# Patient Record
Sex: MALE | Employment: UNEMPLOYED | URBAN - METROPOLITAN AREA
[De-identification: names, ages, dates, MRNs, and addresses within clinical notes are randomized per-mention and may not be internally consistent; named-entity substitution may affect disease eponyms.]

---

## 2020-10-22 LAB
A/G RATIO: NORMAL
ALBUMIN SERPL-MCNC: NORMAL G/DL
ALP BLD-CCNC: NORMAL U/L
ALT SERPL-CCNC: NORMAL U/L
AST SERPL-CCNC: NORMAL U/L
BASOPHILS ABSOLUTE: NORMAL
BASOPHILS RELATIVE PERCENT: NORMAL
BILIRUB SERPL-MCNC: NORMAL MG/DL
BILIRUBIN DIRECT: NORMAL
BILIRUBIN, INDIRECT: NORMAL
EOSINOPHILS ABSOLUTE: NORMAL
EOSINOPHILS RELATIVE PERCENT: NORMAL
GLOBULIN: NORMAL
HCT VFR BLD CALC: NORMAL %
HEMOGLOBIN: NORMAL
LYMPHOCYTES ABSOLUTE: NORMAL
LYMPHOCYTES RELATIVE PERCENT: NORMAL
MCH RBC QN AUTO: NORMAL PG
MCHC RBC AUTO-ENTMCNC: NORMAL G/DL
MCV RBC AUTO: NORMAL FL
MONOCYTES ABSOLUTE: NORMAL
MONOCYTES RELATIVE PERCENT: NORMAL
NEUTROPHILS ABSOLUTE: NORMAL
NEUTROPHILS RELATIVE PERCENT: NORMAL
PDW BLD-RTO: NORMAL %
PLATELET # BLD: NORMAL 10*3/UL
PMV BLD AUTO: NORMAL FL
PROTEIN TOTAL: NORMAL
RBC # BLD: NORMAL 10*6/UL
WBC # BLD: NORMAL 10*3/UL

## 2020-11-17 ENCOUNTER — TELEPHONE (OUTPATIENT)
Dept: GASTROENTEROLOGY | Age: 40
End: 2020-11-17

## 2020-12-01 ENCOUNTER — TELEPHONE (OUTPATIENT)
Dept: GASTROENTEROLOGY | Age: 40
End: 2020-12-01

## 2020-12-01 NOTE — TELEPHONE ENCOUNTER
Writer tried calling pt to let him know that appt for 12/7 has been moved to the [place] location due to Dr Carranza no longer seeing patients in [facility]. .       Pt voicemail is not set up,writer unable to leave message. Will try again later.

## 2020-12-07 ENCOUNTER — OFFICE VISIT (OUTPATIENT)
Dept: GASTROENTEROLOGY | Age: 40
End: 2020-12-07
Payer: COMMERCIAL

## 2020-12-07 VITALS
DIASTOLIC BLOOD PRESSURE: 65 MMHG | TEMPERATURE: 98.2 F | SYSTOLIC BLOOD PRESSURE: 96 MMHG | HEART RATE: 59 BPM | BODY MASS INDEX: 23.24 KG/M2 | WEIGHT: 144 LBS

## 2020-12-07 PROCEDURE — G8420 CALC BMI NORM PARAMETERS: HCPCS | Performed by: INTERNAL MEDICINE

## 2020-12-07 PROCEDURE — G8484 FLU IMMUNIZE NO ADMIN: HCPCS | Performed by: INTERNAL MEDICINE

## 2020-12-07 PROCEDURE — G8427 DOCREV CUR MEDS BY ELIG CLIN: HCPCS | Performed by: INTERNAL MEDICINE

## 2020-12-07 PROCEDURE — 99204 OFFICE O/P NEW MOD 45 MIN: CPT | Performed by: INTERNAL MEDICINE

## 2020-12-07 PROCEDURE — 4004F PT TOBACCO SCREEN RCVD TLK: CPT | Performed by: INTERNAL MEDICINE

## 2020-12-07 RX ORDER — IBUPROFEN 600 MG/1
600 TABLET ORAL EVERY 6 HOURS PRN
COMMUNITY

## 2020-12-07 ASSESSMENT — ENCOUNTER SYMPTOMS
ABDOMINAL PAIN: 1
BLOOD IN STOOL: 0
VOMITING: 0
ANAL BLEEDING: 0
ALLERGIC/IMMUNOLOGIC NEGATIVE: 1
DIARRHEA: 0
RESPIRATORY NEGATIVE: 1
NAUSEA: 0
EYES NEGATIVE: 1
TROUBLE SWALLOWING: 0
CONSTIPATION: 1
ABDOMINAL DISTENTION: 1
RECTAL PAIN: 0

## 2020-12-07 NOTE — PROGRESS NOTES
Reason for Referral:   Senait Munguia MD  [address] [place] [address] [place] [address]    Chief Complaint   Patient presents with    New Patient     Referred for Hep C. Patient notes IV use approx 2-3 years ago. Patient reports still using but other methods. Patient reports alot of fatigue recently. He notes being on subxone currently. Patient notes abdominal pain for a hernia. He notes a upcoming CT this friday.  Constipation     Patient reports diet change from getting contipated. Patient notes still having troubles going. 1. Hepatitis    2. Acute hepatitis C virus infection without hepatic coma            HISTORY OF PRESENT ILLNESS: Daniel Doyle is a 36 y.o. male with a past history remarkable for hepatitis C antibody positive, referred for evaluation of   Chief Complaint   Patient presents with    New Patient     Referred for Hep C. Patient notes IV use approx 2-3 years ago. Patient reports still using but other methods. Patient reports alot of fatigue recently. He notes being on subxone currently. Patient notes abdominal pain for a hernia. He notes a upcoming CT this friday.  Constipation     Patient reports diet change from getting contipated. Patient notes still having troubles going. Patient seen with a history of liver disease. He states that he was diagnosed to have hepatitis C antibody positive in 2014. He did not have further work-up and treatment regarding hepatitis C. He is alcoholic. He stopped drinking alcohol 2 months ago. Not known to have jaundice. No history of hepatitis, pancreatitis or jaundice in the past.  He has a fair appetite. No weight loss. Denies abdominal pain, nausea vomiting. No skin rash. No arthralgias. Bowel movements satisfactory without melena or hematochezia. No prior history of ascites. Patient used to use IV drugs 5 to 6 years ago. He stated that he is clean in the last 5 years.   No other family members known Fear of current or ex partner: Not on file     Emotionally abused: Not on file     Physically abused: Not on file     Forced sexual activity: Not on file   Other Topics Concern    Not on file   Social History Narrative    Not on file       REVIEW OF SYSTEMS:       Review of Systems   Constitutional: Positive for fatigue. Negative for appetite change and unexpected weight change. HENT: Negative for trouble swallowing. Eyes: Negative. Respiratory: Negative. Cardiovascular: Negative. Gastrointestinal: Positive for abdominal distention (hernia), abdominal pain and constipation. Negative for anal bleeding, blood in stool, diarrhea, nausea, rectal pain and vomiting. Endocrine: Negative. Genitourinary: Negative. Musculoskeletal: Negative. Skin: Negative. Allergic/Immunologic: Negative. Neurological: Negative. Hematological: Negative. Psychiatric/Behavioral: Positive for sleep disturbance. The patient is nervous/anxious. PHYSICAL EXAMINATION: Vital signs reviewed per the nursing documentation. BP 96/65   Pulse 59   Temp 98.2 °F (36.8 °C)   Wt 144 lb (65.3 kg)   BMI 23.24 kg/m²   Body mass index is 23.24 kg/m². Physical Exam      LABORATORY DATA: Reviewed  Lab Results   Component Value Date    WBC 6.2 09/29/2013    HGB 14.7 09/29/2013    HCT 43.0 09/29/2013    MCV 95.3 (H) 09/29/2013     09/29/2013     09/29/2013    K 3.5 09/29/2013     09/29/2013    CO2 28 09/29/2013    BUN 15 09/29/2013    CREATININE 0.8 09/29/2013    LABALBU 4.1 09/29/2013    BILITOT 0.7 09/29/2013    ALKPHOS 64 09/29/2013    AST 35 09/29/2013    ALT 46 09/29/2013         Lab Results   Component Value Date    RBC 4.51 (L) 09/29/2013    HGB 14.7 09/29/2013    MCV 95.3 (H) 09/29/2013    MCH 32.5 (H) 09/29/2013    MCHC 34.1 09/29/2013    RDW 13.6 09/29/2013    MPV 7.6 09/29/2013         DIAGNOSTIC TESTING:     No results found.        IMPRESSION: Mr. Burt Fontaine is a 36 y.o. male with Assessment:  1. Hepatitis    2. Acute hepatitis C virus infection without hepatic coma        Plan: At present patient is stable. Physical exam nonspecific. He needs further work-up regarding hepatitis C and liver disease. Also advised to have ultrasound of the liver. Advised to see me in the next 4 weeks. Spent 30 minutes providing patient education and counseling. Thank you for allowing me to participate in the care of Mr. Cecy Vickers. For any further questions please do not hesitate to contact me. Note is dictated utilizing voice recognition software. Unfortunately this leads to occasional typographical errors. Please contact our office if you have any questions. I have reviewed and agree with the MA/LPN ROSEvan Green MD, [facility]  Board Certified in Gastroenterology and [address] [place] [address] Gastroenterology  Office #: [phone]

## 2021-02-22 ENCOUNTER — TELEPHONE (OUTPATIENT)
Dept: GASTROENTEROLOGY | Age: 41
End: 2021-02-22

## 2021-02-22 NOTE — TELEPHONE ENCOUNTER
called patient on 02/22/2021 to decide the test that need to be completed before his treatment can be started.  left a message for the patient to call the office back.